# Patient Record
Sex: MALE | Race: WHITE | NOT HISPANIC OR LATINO | Employment: FULL TIME | URBAN - METROPOLITAN AREA
[De-identification: names, ages, dates, MRNs, and addresses within clinical notes are randomized per-mention and may not be internally consistent; named-entity substitution may affect disease eponyms.]

---

## 2021-05-25 ENCOUNTER — RECORDS - HEALTHEAST (OUTPATIENT)
Dept: ADMINISTRATIVE | Facility: CLINIC | Age: 56
End: 2021-05-25

## 2021-05-26 ENCOUNTER — RECORDS - HEALTHEAST (OUTPATIENT)
Dept: ADMINISTRATIVE | Facility: CLINIC | Age: 56
End: 2021-05-26

## 2022-10-29 ENCOUNTER — APPOINTMENT (OUTPATIENT)
Dept: MRI IMAGING | Facility: CLINIC | Age: 57
End: 2022-10-29
Attending: EMERGENCY MEDICINE
Payer: COMMERCIAL

## 2022-10-29 ENCOUNTER — HOSPITAL ENCOUNTER (EMERGENCY)
Facility: CLINIC | Age: 57
Discharge: HOME | End: 2022-10-29
Attending: EMERGENCY MEDICINE | Admitting: EMERGENCY MEDICINE
Payer: COMMERCIAL

## 2022-10-29 VITALS
WEIGHT: 175 LBS | TEMPERATURE: 98.1 F | OXYGEN SATURATION: 100 % | RESPIRATION RATE: 18 BRPM | DIASTOLIC BLOOD PRESSURE: 80 MMHG | SYSTOLIC BLOOD PRESSURE: 148 MMHG | BODY MASS INDEX: 23.7 KG/M2 | HEART RATE: 72 BPM | HEIGHT: 72 IN

## 2022-10-29 DIAGNOSIS — H81.10 BENIGN PAROXYSMAL POSITIONAL VERTIGO, UNSPECIFIED LATERALITY: ICD-10-CM

## 2022-10-29 LAB
ANION GAP SERPL CALCULATED.3IONS-SCNC: 14 MMOL/L (ref 5–18)
BASOPHILS # BLD AUTO: 0.1 10E3/UL (ref 0–0.2)
BASOPHILS NFR BLD AUTO: 1 %
BUN SERPL-MCNC: 18 MG/DL (ref 8–22)
CALCIUM SERPL-MCNC: 9.4 MG/DL (ref 8.5–10.5)
CHLORIDE BLD-SCNC: 104 MMOL/L (ref 98–107)
CO2 SERPL-SCNC: 18 MMOL/L (ref 22–31)
CREAT SERPL-MCNC: 0.91 MG/DL (ref 0.7–1.3)
EOSINOPHIL # BLD AUTO: 0.3 10E3/UL (ref 0–0.7)
EOSINOPHIL NFR BLD AUTO: 6 %
ERYTHROCYTE [DISTWIDTH] IN BLOOD BY AUTOMATED COUNT: 11.9 % (ref 10–15)
GFR SERPL CREATININE-BSD FRML MDRD: >90 ML/MIN/1.73M2
GLUCOSE BLD-MCNC: 132 MG/DL (ref 70–125)
GLUCOSE BLDC GLUCOMTR-MCNC: 117 MG/DL (ref 70–99)
HCT VFR BLD AUTO: 42.2 % (ref 40–53)
HGB BLD-MCNC: 14.6 G/DL (ref 13.3–17.7)
IMM GRANULOCYTES # BLD: 0 10E3/UL
IMM GRANULOCYTES NFR BLD: 0 %
LYMPHOCYTES # BLD AUTO: 2.5 10E3/UL (ref 0.8–5.3)
LYMPHOCYTES NFR BLD AUTO: 46 %
MCH RBC QN AUTO: 32.5 PG (ref 26.5–33)
MCHC RBC AUTO-ENTMCNC: 34.6 G/DL (ref 31.5–36.5)
MCV RBC AUTO: 94 FL (ref 78–100)
MONOCYTES # BLD AUTO: 0.6 10E3/UL (ref 0–1.3)
MONOCYTES NFR BLD AUTO: 10 %
NEUTROPHILS # BLD AUTO: 2.1 10E3/UL (ref 1.6–8.3)
NEUTROPHILS NFR BLD AUTO: 37 %
NRBC # BLD AUTO: 0 10E3/UL
NRBC BLD AUTO-RTO: 0 /100
PLATELET # BLD AUTO: 299 10E3/UL (ref 150–450)
POTASSIUM BLD-SCNC: 3.6 MMOL/L (ref 3.5–5)
RBC # BLD AUTO: 4.49 10E6/UL (ref 4.4–5.9)
SODIUM SERPL-SCNC: 136 MMOL/L (ref 136–145)
WBC # BLD AUTO: 5.5 10E3/UL (ref 4–11)

## 2022-10-29 PROCEDURE — 93005 ELECTROCARDIOGRAM TRACING: CPT | Performed by: EMERGENCY MEDICINE

## 2022-10-29 PROCEDURE — 85025 COMPLETE CBC W/AUTO DIFF WBC: CPT | Performed by: EMERGENCY MEDICINE

## 2022-10-29 PROCEDURE — 96375 TX/PRO/DX INJ NEW DRUG ADDON: CPT

## 2022-10-29 PROCEDURE — 36415 COLL VENOUS BLD VENIPUNCTURE: CPT | Performed by: EMERGENCY MEDICINE

## 2022-10-29 PROCEDURE — 255N000002 HC RX 255 OP 636: Performed by: EMERGENCY MEDICINE

## 2022-10-29 PROCEDURE — 99285 EMERGENCY DEPT VISIT HI MDM: CPT | Mod: 25

## 2022-10-29 PROCEDURE — 80048 BASIC METABOLIC PNL TOTAL CA: CPT | Performed by: EMERGENCY MEDICINE

## 2022-10-29 PROCEDURE — 96374 THER/PROPH/DIAG INJ IV PUSH: CPT | Mod: 59

## 2022-10-29 PROCEDURE — 250N000011 HC RX IP 250 OP 636: Performed by: EMERGENCY MEDICINE

## 2022-10-29 PROCEDURE — 70553 MRI BRAIN STEM W/O & W/DYE: CPT

## 2022-10-29 PROCEDURE — 250N000013 HC RX MED GY IP 250 OP 250 PS 637: Performed by: EMERGENCY MEDICINE

## 2022-10-29 PROCEDURE — 70549 MR ANGIOGRAPH NECK W/O&W/DYE: CPT

## 2022-10-29 PROCEDURE — 70544 MR ANGIOGRAPHY HEAD W/O DYE: CPT

## 2022-10-29 PROCEDURE — A9585 GADOBUTROL INJECTION: HCPCS | Performed by: EMERGENCY MEDICINE

## 2022-10-29 RX ORDER — ONDANSETRON 2 MG/ML
8 INJECTION INTRAMUSCULAR; INTRAVENOUS ONCE
Status: COMPLETED | OUTPATIENT
Start: 2022-10-29 | End: 2022-10-29

## 2022-10-29 RX ORDER — MECLIZINE HYDROCHLORIDE 25 MG/1
50 TABLET ORAL ONCE
Status: COMPLETED | OUTPATIENT
Start: 2022-10-29 | End: 2022-10-29

## 2022-10-29 RX ORDER — ASPIRIN 81 MG/1
324 TABLET, CHEWABLE ORAL ONCE
Status: COMPLETED | OUTPATIENT
Start: 2022-10-29 | End: 2022-10-29

## 2022-10-29 RX ORDER — ONDANSETRON 4 MG/1
8 TABLET, ORALLY DISINTEGRATING ORAL EVERY 8 HOURS PRN
Qty: 10 TABLET | Refills: 0 | Status: SHIPPED | OUTPATIENT
Start: 2022-10-29 | End: 2022-11-01

## 2022-10-29 RX ORDER — GADOBUTROL 604.72 MG/ML
10 INJECTION INTRAVENOUS ONCE
Status: COMPLETED | OUTPATIENT
Start: 2022-10-29 | End: 2022-10-29

## 2022-10-29 RX ORDER — LORAZEPAM 2 MG/ML
1 INJECTION INTRAMUSCULAR ONCE
Status: COMPLETED | OUTPATIENT
Start: 2022-10-29 | End: 2022-10-29

## 2022-10-29 RX ORDER — MECLIZINE HYDROCHLORIDE 25 MG/1
50 TABLET ORAL 3 TIMES DAILY PRN
Qty: 30 TABLET | Refills: 0 | Status: SHIPPED | OUTPATIENT
Start: 2022-10-29

## 2022-10-29 RX ADMIN — GADOBUTROL 10 ML: 604.72 INJECTION INTRAVENOUS at 10:23

## 2022-10-29 RX ADMIN — LORAZEPAM 1 MG: 2 INJECTION INTRAMUSCULAR; INTRAVENOUS at 08:05

## 2022-10-29 RX ADMIN — ONDANSETRON 8 MG: 2 INJECTION INTRAMUSCULAR; INTRAVENOUS at 08:04

## 2022-10-29 RX ADMIN — MECLIZINE HYDROCHLORIDE 50 MG: 25 TABLET ORAL at 08:06

## 2022-10-29 RX ADMIN — GADOBUTROL 10 ML: 604.72 INJECTION INTRAVENOUS at 10:30

## 2022-10-29 RX ADMIN — ASPIRIN 81 MG 324 MG: 81 TABLET ORAL at 08:06

## 2022-10-29 ASSESSMENT — ACTIVITIES OF DAILY LIVING (ADL)
ADLS_ACUITY_SCORE: 35
ADLS_ACUITY_SCORE: 35

## 2022-10-29 NOTE — ED PROVIDER NOTES
EMERGENCY DEPARTMENT ENCOUNTER      NAME: Des Inman  AGE: 57 year old male  YOB: 1965  MRN: [id number]  EVALUATION DATE & TIME: No admission date for patient encounter.    PCP: No primary care provider on file.    ED PROVIDER: Litzy Baeza MD    Chief Complaint   Patient presents with     Dizziness         FINAL IMPRESSION:  1. Benign paroxysmal positional vertigo, unspecified laterality          ED COURSE & MEDICAL DECISION MAKIN:45 AM I met with the patient, obtained history, performed an initial exam, and discussed options and plan for diagnostics and treatment here in the ED.   11:05 AM I rechecked and updated the patient on his imaging results.    Pertinent Labs & Imaging studies reviewed. (See chart for details)  57 year old male with history of HTN who presents to the Emergency Department for evaluation of sudden onset vertigo when bending over this morning with nausea.  Symptoms seem most consistent with BPPV, Ménière's, labyrinthitis, peripheral etiology.  Less likely posterior circulation CVA, ICH, TIA, mass lesion or vertebrobasilar insufficiency.  Patient describes some tingling around the fingertips, toes, lips and this is no doubt related to hyperventilation syndrome.  Patient is visibly anxious on exam.  He does not have any lateralizing paresthesias.    Patient evaluated in in triage by myself.  Placed on monitor, IV established and blood obtained.  Twelve-lead EKG shows sinus rhythm without ischemic changes.  CBC and BMP unremarkable.  Patient was given Zofran, Ativan, meclizine.  MRI brain/cow/carotid unremarkable.  Patient felt improved after the above and will be discharged home with symptomatic management and outpatient follow-up..            Medical Decision Making    Supplemental history from: Family Member    External Record(s) Reviewed: Outpatient Record    Differential Diagnosis: See MDM charting for differential considered.     I performed an independent  interpretation of the: EKG: See my documentation.    Discussed with radiology regarding test interpretation: N/A    Discussion of management with another provider: N/A    The following testing was considered but ultimately not selected: None     I considered prescription management with: Symptomatic Management    The patient's care impacted: Hypertension    Consideration of Admission/Observation: N/A - Patient admitted or discharged without consideration for admission    Care significantly affected by Social Determinants of Health including: N/A    At the conclusion of the encounter I discussed the results of all of the tests and the disposition. The questions were answered. The patient or family acknowledged understanding and was agreeable with the care plan.      MEDICATIONS GIVEN IN THE EMERGENCY:  Medications   aspirin (ASA) chewable tablet 324 mg (324 mg Oral Given 10/29/22 0806)   ondansetron (ZOFRAN) injection 8 mg (8 mg Intravenous Given 10/29/22 0804)   meclizine (ANTIVERT) tablet 50 mg (50 mg Oral Given 10/29/22 0806)   LORazepam (ATIVAN) injection 1 mg (1 mg Intravenous Given 10/29/22 0805)   gadobutrol (GADAVIST) injection 10 mL (10 mLs Intravenous Given 10/29/22 1030)   gadobutrol (GADAVIST) injection 10 mL (10 mLs Intravenous Given 10/29/22 1023)       NEW PRESCRIPTIONS STARTED AT TODAY'S ER VISIT  New Prescriptions    MECLIZINE (ANTIVERT) 25 MG TABLET    Take 2 tablets (50 mg) by mouth 3 times daily as needed for dizziness    ONDANSETRON (ZOFRAN ODT) 4 MG ODT TAB    Take 2 tablets (8 mg) by mouth every 8 hours as needed for nausea          =================================================================    HPI    Patient information was obtained from: Patient    Use of Intrepreter: N/A        Des Inman is a 57 year old male with pertinent medical history of HTN (losartan) who presents with dizziness.    Patient reports recent diagnosis of hypertension and was started on losartan. He reports today  "he has had intermittent dizziness and fullness in his head. He got up this morning feeling fine, then bent to the ground and had a \"split second start\" where he suddenly felt like he was on a carnival ride. He describes vertigo, room spinning sensation, nausea and feeling anxious. Patient reports he developed numbness and tingling around lips, fingertips and toes on both sides of his body. He notes a family history of vertigo BPPV. No issues himself. Wife drove the patient here. Patient is able to ambulate without difficulty. Wife has not noticed any slurred speech. Repeated head movements with vertical positioning reproduces vertigo. If he holds his head still and closes his eyes, the spinning is improved. No other current complaints.      REVIEW OF SYSTEMS  Constitutional:  Denies fever, chills, weight loss or weakness  Eyes:  No pain, discharge, redness  HENT:  Denies sore throat, ear pain, congestion  Respiratory: No SOB, wheeze or cough  Cardiovascular:  No CP, palpitations  GI:  Denies abdominal pain, vomiting, diarrhea. Positive for nausea  Musculoskeletal:  Denies any new muscle/joint pain, swelling or loss of function.  Neurologic:  Denies headache, focal weakness or sensory changes. Positive for dizziness, numbness, and paresthesias.  Lymph: Denies swollen nodes  Psych: Positive for anxiety.    All other systems negative unless noted in HPI.      PAST MEDICAL HISTORY:  Past Medical History:   Diagnosis Date     Hypertension        PAST SURGICAL HISTORY:  History reviewed. No pertinent surgical history.    CURRENT MEDICATIONS:    None       ALLERGIES:  Allergies   Allergen Reactions     Tetanus-Diphtheria Toxoids        FAMILY HISTORY:  No family history on file.    SOCIAL HISTORY:        VITALS:  Patient Vitals for the past 24 hrs:   BP Temp Temp src Pulse Resp SpO2 Height Weight   10/29/22 0749 (!) 152/75 98.1  F (36.7  C) Temporal 70 18 100 % 1.829 m (6') 79.4 kg (175 lb)       PHYSICAL EXAM    General " Appearance: Well-appearing, well-nourished. Very anxious, hyperventilating  Head:  Normocephalic, atraumatic  Eyes:  PERRL, conjunctiva/corneas clear, EOM's intact, no appreciable nystagmus  ENT:  Lips, mucosa, and tongue normal; membranes are moist without pallor, TMs clear  Neck:  Supple  Cardio:  Regular rate and rhythm  Pulm:  No respiratory distress  Abdomen:  Soft, non-tender  Extremities:  Extremities normal, atraumatic, no cyanosis or edema, full ROM and motor tone intact, bilateral pulses intact upper and lower. 5/5 upper/lower extremity strength  Skin:  Skin warm, dry, no rashes  Neuro:  Alert and oriented ×3, answers questions and follows commands appropriately, moving all extremities, no gross sensory defects. Cranial nerves III-X intact. Normal sensation, no aphasia or dysarthria. HINTS exam reassuring         RADIOLOGY/LABS:  Reviewed all pertinent imaging. Please see official radiology report. All pertinent labs reviewed and interpreted.    Results for orders placed or performed during the hospital encounter of 10/29/22   MR Brain w/o & w Contrast    Impression    IMPRESSION:  HEAD MRI:   1.  No acute infarct, acute intracranial hemorrhage or intracranial mass.    HEAD MRA:   1.  No aneurysm, high flow AVM or significant stenosis identified.  2.  Variant [place] anatomy as above.    NECK MRA:  1.  No significant stenosis of the bilateral internal carotid arteries based on NASCET criteria.   MRA Brain ([place]) wo Contrast    Impression    IMPRESSION:  HEAD MRI:   1.  No acute infarct, acute intracranial hemorrhage or intracranial mass.    HEAD MRA:   1.  No aneurysm, high flow AVM or significant stenosis identified.  2.  Variant [place] anatomy as above.    NECK MRA:  1.  No significant stenosis of the bilateral internal carotid arteries based on NASCET criteria.   MRA Neck (Carotids) wo & w Contrast    Impression    IMPRESSION:  HEAD MRI:   1.  No acute infarct, acute  intracranial hemorrhage or intracranial mass.    HEAD MRA:   1.  No aneurysm, high flow AVM or significant stenosis identified.  2.  Variant [place] anatomy as above.    NECK MRA:  1.  No significant stenosis of the bilateral internal carotid arteries based on NASCET criteria.   Glucose by meter   Result Value Ref Range    GLUCOSE BY METER POCT 117 (H) 70 - 99 mg/dL   Basic metabolic panel   Result Value Ref Range    Sodium 136 136 - 145 mmol/L    Potassium 3.6 3.5 - 5.0 mmol/L    Chloride 104 98 - 107 mmol/L    Carbon Dioxide (CO2) 18 (L) 22 - 31 mmol/L    Anion Gap 14 5 - 18 mmol/L    Urea Nitrogen 18 8 - 22 mg/dL    Creatinine 0.91 0.70 - 1.30 mg/dL    Calcium 9.4 8.5 - 10.5 mg/dL    Glucose 132 (H) 70 - 125 mg/dL    GFR Estimate >90 >60 mL/min/1.73m2   CBC with platelets and differential   Result Value Ref Range    WBC Count 5.5 4.0 - 11.0 10e3/uL    RBC Count 4.49 4.40 - 5.90 10e6/uL    Hemoglobin 14.6 13.3 - 17.7 g/dL    Hematocrit 42.2 40.0 - 53.0 %    MCV 94 78 - 100 fL    MCH 32.5 26.5 - 33.0 pg    MCHC 34.6 31.5 - 36.5 g/dL    RDW 11.9 10.0 - 15.0 %    Platelet Count 299 150 - 450 10e3/uL    % Neutrophils 37 %    % Lymphocytes 46 %    % Monocytes 10 %    % Eosinophils 6 %    % Basophils 1 %    % Immature Granulocytes 0 %    NRBCs per 100 WBC 0 <1 /100    Absolute Neutrophils 2.1 1.6 - 8.3 10e3/uL    Absolute Lymphocytes 2.5 0.8 - 5.3 10e3/uL    Absolute Monocytes 0.6 0.0 - 1.3 10e3/uL    Absolute Eosinophils 0.3 0.0 - 0.7 10e3/uL    Absolute Basophils 0.1 0.0 - 0.2 10e3/uL    Absolute Immature Granulocytes 0.0 <=0.4 10e3/uL    Absolute NRBCs 0.0 10e3/uL       EKG:  Performed at: 8:22 AM    Impression: Normal sinus rhythm. Minimal voltage criteria for LVH, may be normal variant.    Rate: 66 bpm  Rhythm: normal sinus rhythm  Axis: 67  VT Interval: 202 ms  QRS Interval: 102 ms  QTc Interval: 450 ms  ST Changes: none  Comparison: n/a  I have independently reviewed and interpreted the EKG(s) documented  above.    The creation of this record is based on the scribe s observations of the work being performed by Litzy Baeza MD and the provider s statements to them. It was created on her behalf by Tonia Schaefer, a trained medical scribe. This document has been checked and approved by the attending provider.    Litzy Baeza MD  Emergency Medicine  [facility] EMERGENCY ROOM  [address] [facility] [address]  [phone]  Dept: [phone]     Litzy Baeza MD  10/29/22 1128

## 2022-10-29 NOTE — ED TRIAGE NOTES
Sudden onset of room spinning dizziness when waking up this morning.  History of hypertension and took morning meds.  C/O numbness in both hands.  Dr. Baeza in triage for stoke eval.  No stroke code called.   Triage Assessment     Row Name 10/29/22 0751       Triage Assessment (Adult)    Airway WDL WDL       Respiratory WDL    Respiratory WDL WDL       Skin Circulation/Temperature WDL    Skin Circulation/Temperature WDL WDL       Cardiac WDL    Cardiac WDL WDL       Peripheral/Neurovascular WDL    Peripheral Neurovascular WDL X  Numbness hands bilaterally       Cognitive/Neuro/Behavioral WDL    Cognitive/Neuro/Behavioral WDL X  Dizziness

## 2023-02-20 ENCOUNTER — APPOINTMENT (OUTPATIENT)
Dept: RADIOLOGY | Facility: CLINIC | Age: 58
End: 2023-02-20
Attending: EMERGENCY MEDICINE
Payer: OTHER MISCELLANEOUS

## 2023-02-20 ENCOUNTER — HOSPITAL ENCOUNTER (EMERGENCY)
Facility: CLINIC | Age: 58
Discharge: HOME | End: 2023-02-20
Attending: EMERGENCY MEDICINE | Admitting: EMERGENCY MEDICINE
Payer: OTHER MISCELLANEOUS

## 2023-02-20 ENCOUNTER — APPOINTMENT (OUTPATIENT)
Dept: CT IMAGING | Facility: CLINIC | Age: 58
End: 2023-02-20
Attending: EMERGENCY MEDICINE
Payer: OTHER MISCELLANEOUS

## 2023-02-20 VITALS
HEIGHT: 72 IN | HEART RATE: 98 BPM | DIASTOLIC BLOOD PRESSURE: 75 MMHG | TEMPERATURE: 97.6 F | SYSTOLIC BLOOD PRESSURE: 163 MMHG | WEIGHT: 175 LBS | OXYGEN SATURATION: 97 % | RESPIRATION RATE: 16 BRPM | BODY MASS INDEX: 23.7 KG/M2
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
## 2023-02-20 DIAGNOSIS — M54.50 ACUTE RIGHT-SIDED LOW BACK PAIN WITHOUT SCIATICA: ICD-10-CM

## 2023-02-20 DIAGNOSIS — S09.90XA INJURY OF HEAD, INITIAL ENCOUNTER: ICD-10-CM

## 2023-02-20 PROCEDURE — 70450 CT HEAD/BRAIN W/O DYE: CPT

## 2023-02-20 PROCEDURE — 99284 EMERGENCY DEPT VISIT MOD MDM: CPT | Mod: 25

## 2023-02-20 PROCEDURE — 72100 X-RAY EXAM L-S SPINE 2/3 VWS: CPT

## 2023-02-20 ASSESSMENT — ACTIVITIES OF DAILY LIVING (ADL): ADLS_ACUITY_SCORE: 36

## 2023-02-20 ASSESSMENT — ENCOUNTER SYMPTOMS
ABDOMINAL PAIN: 0
SHORTNESS OF BREATH: 0
NUMBNESS: 0
HEMATURIA: 0
BACK PAIN: 1
NECK PAIN: 1

## 2023-02-20 NOTE — Clinical Note
Des Inman was seen and treated in our emergency department on 2/20/2023.  He may return to work on 02/23/2023.       If you have any questions or concerns, please don't hesitate to call.      Ohl, Suzi EDMONDS, DO

## 2023-02-20 NOTE — ED TRIAGE NOTES
Patient presents to ED after falling backward while working, slipped on ice outside in the road, he now has pain to lower R back, he hit his head, he reports that he has mild pain to back of head, denies LOC, no on thinners, reported that he initially had vision issues that have resolved and now has fullness to R ear.  Carolina Schmid RN.......2/20/2023 12:52 PM     Triage Assessment     Row Name 02/20/23 1249       Triage Assessment (Adult)    Airway WDL WDL       Respiratory WDL    Respiratory WDL WDL       Skin Circulation/Temperature WDL    Skin Circulation/Temperature WDL WDL       Cardiac WDL    Cardiac WDL WDL       Peripheral/Neurovascular WDL    Peripheral Neurovascular WDL WDL       Cognitive/Neuro/Behavioral WDL    Cognitive/Neuro/Behavioral WDL WDL

## 2023-02-20 NOTE — ED PROVIDER NOTES
EMERGENCY DEPARTMENT ENCOUNTER      NAME: Des Inman  AGE: 57 year old male  YOB: 1965  MRN: [id number]  EVALUATION DATE & TIME: 2/20/2023  4:34 PM    PCP: Chet Ackerman    ED PROVIDER: Suzi Pickering DO      Chief Complaint   Patient presents with     Fall     Back Pain     Head Injury         FINAL IMPRESSION:  1. Fall, initial encounter    2. Acute right-sided low back pain without sciatica    3. Injury of head, initial encounter          ED COURSE & MEDICAL DECISION MAKING:    Pertinent Labs & Imaging studies reviewed. (See chart for details)    4:43 PM I met with the patient to gather history and perform my exam. ED course and treatment discussed.     57 year old male presents to the Emergency Department for evaluation of back pain, head injury after a fall.  He reports he slipped on the ice.  This occurred around 5 hours prior to my evaluation.  No loss of consciousness but did notice some changes in vision and fullness in his right ear.  Reports prior history of concussion.  Had some vision changes that are now improved but still does not hear very well out of his right ear.  He has no focal neurologic deficits.  He has some right low back pain.  No gross hematuria.  No flank pain or abdominal pain to suggest intra abdominal trauma or rib fracture or lung injury.  He has no signs or symptoms of cauda equina.  TMs are normal bilaterally.  He has no midline cervical tenderness.  He has some right lateral cervical spine pain, however is full range of motion no significant trauma or neurologic deficit to suggest fracture.  His CT of his head was negative for fracture and bleed.  X-ray of the lumbar spine is negative as well.  We did discuss symptomatic care for his head injury and back pain.  Discussed expected time course of symptoms and return precautions.    At the conclusion of the encounter I discussed the results of all of the tests and the disposition. The questions were answered. The  Pt is calling and checking to see if both appt for next week can be put into one.  Please advise    "patient or family acknowledged understanding and was agreeable with the care plan.     Medical Decision Making    History:    Supplemental history from: Documented in chart, if applicable    External Record(s) reviewed: Documented in chart, if applicable.    Work Up:    Chart documentation includes differential considered and any EKGs or imaging independently interpreted by provider, where specified.    In additional to work up documented, I considered the following work up: Documented in chart, if applicable.    External consultation:    Discussion of management with another provider: Documented in chart, if applicable    Complicating factors:    Care impacted by chronic illness: Hypertension    Care affected by social determinants of health: N/A    Disposition considerations: Discharge. No recommendations on prescription strength medication(s). I considered admission, but ultimately discharged patient after reassuring workup and exam.      MEDICATIONS GIVEN IN THE EMERGENCY:  Medications - No data to display    NEW PRESCRIPTIONS STARTED AT TODAY'S ER VISIT  New Prescriptions    No medications on file          =================================================================    HPI    Patient information was obtained from: Patient     Use of : N/A       Des Inman is a 57 year old male with pertinent history of HTN who presents to this ED via walk-in for evaluation of a fall    The patient reports they were outside at work around 11:15 AM this morning when they \"fell down\" because \"it was slippery\" and hit their head on the ice. The patient denies loss of consciousness, but states they \"lost time\". Immediately following the fall, the patient states they, \"couldn't get [their] equilibrium\", it felt like they were \"swimming\", and they \"couldn't get up\". After the patient \"found [their] equilibrium, they tried to drive but their \"vision was askew\" and looked like a \"waterfall\" in their right eye. The " "patient also feels as though the hearing in their right ear is \"compromised\". They also note some neck pain, but they think it feels like a \"soft tissue\" injury. The patient also reports some low back pain. The patient denies blood thinners, numbness or tingling in their legs, hematuria, shortness of breath, or abdominal pain.      REVIEW OF SYSTEMS   Review of Systems   HENT: Positive for hearing loss (right ear, hearing is \"compromised\").    Eyes: Positive for visual disturbance (\"waterfall\" in right eye).   Respiratory: Negative for shortness of breath.    Gastrointestinal: Negative for abdominal pain.   Genitourinary: Negative for hematuria.   Musculoskeletal: Positive for back pain (low back) and neck pain.   Neurological: Negative for syncope and numbness.       PAST MEDICAL HISTORY:  Past Medical History:   Diagnosis Date     Hypertension        PAST SURGICAL HISTORY:  History reviewed. No pertinent surgical history.        CURRENT MEDICATIONS:    meclizine (ANTIVERT) 25 MG tablet         ALLERGIES:  Allergies   Allergen Reactions     Tetanus-Diphtheria Toxoids        FAMILY HISTORY:  History reviewed. No pertinent family history.    SOCIAL HISTORY:   Social History     Socioeconomic History     Marital status:        VITAL SIGNS: BP (!) 155/99   Pulse 69   Temp 97.6  F (36.4  C) (Oral)   Resp 18   Ht 1.829 m (6')   Wt 79.4 kg (175 lb)   SpO2 98%   BMI 23.73 kg/m     Constitutional:  Well developed, Well nourished,  HENT:  Normocephalic, Atraumatic, Bilateral external ears normal, No Hemotympanum, Oropharynx moist, No oral exudates, Nose normal. No facial trauma  Neck:  Normal range of motion, No c-spine tenderness, right lateral muscular tenderness, Supple, No stridor.   Eyes:  PERRL, EOMI, Conjunctiva normal, No discharge, Vision normal  Respiratory:  Equal breath sounds bilaterally, No respiratory distress, No wheezing, No chest tenderness or deformity.   Cardiovascular:  Normal heart rate, " Normal rhythm, No murmurs, No rubs, No gallops.   GI:  Soft, No tenderness, No gaurding, No CVA tenderness, no echymosis.   Musculoskeletal:  Neurovascularly intact distally, No edema, No tenderness, No cyanosis, No clubbing. Good range of motion in all major joints. No tenderness to palpation or major deformities noted.   Back:  No midline t-spine or l-spine tenderness, right low back tendenress  Integument:  Warm, Dry, No erythema, No rash.   Neurologic:  Alert & oriented x 3, Normal motor function, Normal sensory function, No focal deficits noted.   Psychiatric:  Affect normal, Judgment normal, Mood normal.     LAB:  All pertinent labs reviewed and interpreted.  Labs Ordered and Resulted from Time of ED Arrival to Time of ED Departure - No data to display    RADIOLOGY:  Reviewed all pertinent imaging. Please see official radiology report.  Lumbar spine XR, 2-3 views   Final Result   IMPRESSION: Straightening of the normal lumbar lordosis. No significant listhesis. Vertebral body heights are maintained. No fracture. Moderate L4-L5 disc degeneration. Mild disc degeneration elsewhere. Moderate lumbar facet degeneration. Normal    extraspinal structures.      Head CT w/o contrast   Final Result   IMPRESSION:   1.  No evidence of acute hemorrhage or other acute intracranial abnormality.   2.  No evidence of acute calvarial fracture.          I, Shanti Monae, am serving as a scribe to document services personally performed by Dr. Suzi Pickering based on my observation and the provider's statements to me. Suzi BUSCH DO attest that Shanti Monae is acting in a scribe capacity, has observed my performance of the services and has documented them in accordance with my direction.    Suzi Pickering DO  Emergency Medicine  [facility] EMERGENCY ROOM  [address] [id number] [phone]  Dept: [phone]     Suzi Pickering DO  02/20/23 0238

## 2023-02-20 NOTE — DISCHARGE INSTRUCTIONS
Imaging of your head shows no signs of fracture or head bleed  You have no fracture to your back as well  As discussed, based off your symptoms likely you do have a concussion  Make sure to rest, avoid any extra screen time  You may use Tylenol, ibuprofen, ice and heat for pain  You may be more sore tomorrow  Return if any pain out of proportion  If you do develop vertigo you can  meclizine or Dramamine over-the-counter for your symptoms